# Patient Record
Sex: FEMALE | Race: WHITE | NOT HISPANIC OR LATINO | Employment: FULL TIME | URBAN - METROPOLITAN AREA
[De-identification: names, ages, dates, MRNs, and addresses within clinical notes are randomized per-mention and may not be internally consistent; named-entity substitution may affect disease eponyms.]

---

## 2019-12-05 ENCOUNTER — OFFICE VISIT (OUTPATIENT)
Dept: OBGYN CLINIC | Facility: CLINIC | Age: 24
End: 2019-12-05
Payer: COMMERCIAL

## 2019-12-05 VITALS
HEIGHT: 67 IN | WEIGHT: 172 LBS | SYSTOLIC BLOOD PRESSURE: 122 MMHG | DIASTOLIC BLOOD PRESSURE: 80 MMHG | BODY MASS INDEX: 27 KG/M2

## 2019-12-05 DIAGNOSIS — Z12.4 ROUTINE CERVICAL SMEAR: ICD-10-CM

## 2019-12-05 DIAGNOSIS — Z11.3 SCREENING FOR STDS (SEXUALLY TRANSMITTED DISEASES): ICD-10-CM

## 2019-12-05 DIAGNOSIS — Z01.419 ENCOUNTER FOR WELL WOMAN EXAM: Primary | ICD-10-CM

## 2019-12-05 DIAGNOSIS — Z11.8 SCREENING FOR CHLAMYDIAL DISEASE: ICD-10-CM

## 2019-12-05 DIAGNOSIS — Z30.42 SURVEILLANCE OF CONTRACEPTIVE INJECTION: ICD-10-CM

## 2019-12-05 PROCEDURE — S0610 ANNUAL GYNECOLOGICAL EXAMINA: HCPCS | Performed by: PHYSICIAN ASSISTANT

## 2019-12-05 RX ORDER — MEDROXYPROGESTERONE ACETATE 150 MG/ML
150 INJECTION, SUSPENSION INTRAMUSCULAR
Qty: 1 ML | Refills: 3 | Status: SHIPPED | OUTPATIENT
Start: 2019-12-05

## 2019-12-05 NOTE — PROGRESS NOTES
Assessment/Plan:    No problem-specific Assessment & Plan notes found for this encounter  Diagnoses and all orders for this visit:    Encounter for well woman exam    Routine cervical smear  -     GP PAP/CT/GC (Reflex HPV PLUS when ASC-US)    Screening for STDs (sexually transmitted diseases)  -     GP PAP/CT/GC (Reflex HPV PLUS when ASC-US)  -     RPR; Future  -     HIV 1/2 AG-AB combo; Future  -     Hepatitis B surface antigen; Future    Screening for chlamydial disease  -     GP PAP/CT/GC (Reflex HPV PLUS when ASC-US)    Surveillance of contraceptive injection  -     medroxyPROGESTERone (DEPO-PROVERA) 150 mg/mL injection; Inject 1 mL (150 mg total) into a muscle every 3 (three) months          Subjective:      Patient ID: Mable Reyes is a 21 y o  female  Pt presents for her annual exam today--  She has no complaints  She has regular bleeding, no pelvic pain  Bowel and bladder are regular  No breast concerns today  Wants BC  H/o BTB on low dose pills  +nausea, stomach pains on higher dose  Patch was ok  Ring made her nervouse  Would like Depo  Would like STD testing for peace of mind, no symptoms    pap today  Cultures done  Rx BW  Rx Depo  mvi        The following portions of the patient's history were reviewed and updated as appropriate: allergies, current medications, past family history, past medical history, past social history, past surgical history and problem list     Review of Systems   Constitutional: Negative for chills, fever and unexpected weight change  Gastrointestinal: Negative for abdominal pain, blood in stool, constipation and diarrhea  Genitourinary: Negative  Objective:      /80 (BP Location: Left arm, Patient Position: Sitting, Cuff Size: Standard)   Ht 5' 6 5" (1 689 m)   Wt 78 kg (172 lb)   LMP 11/21/2019 (Approximate)   Breastfeeding? No   BMI 27 35 kg/m²          Physical Exam   Constitutional: She appears well-developed and well-nourished     HENT: Head: Normocephalic and atraumatic  Neck: Normal range of motion  Pulmonary/Chest: Right breast exhibits no inverted nipple, no mass, no nipple discharge and no skin change  Left breast exhibits no inverted nipple, no mass, no nipple discharge and no skin change  Abdominal: Soft  Genitourinary: Vagina normal and uterus normal  Pelvic exam was performed with patient supine  There is no rash, tenderness or lesion on the right labia  There is no rash, tenderness or lesion on the left labia  Cervix exhibits no motion tenderness, no discharge and no friability  Right adnexum displays no mass, no tenderness and no fullness  Left adnexum displays no mass, no tenderness and no fullness  Lymphadenopathy: No inguinal adenopathy noted on the right or left side  Nursing note and vitals reviewed

## 2019-12-05 NOTE — PROGRESS NOTES
Pt is here for yearly exam   Pt is having regular cycles, and is interested in starting birth control  She was taking a pill about 3 months ago, but stopped because she was forgetting to take it  She has no breast concerns and B&B ok     pap normal, Gc/chlamydia neg 2/23/16

## 2019-12-11 DIAGNOSIS — N76.0 BACTERIAL VAGINOSIS: Primary | ICD-10-CM

## 2019-12-11 DIAGNOSIS — B96.89 BACTERIAL VAGINOSIS: Primary | ICD-10-CM

## 2019-12-11 LAB
DEPRECATED C TRACH RRNA XXX QL PRB: NOT DETECTED
N GONORRHOEA DNA UR QL NAA+PROBE: NOT DETECTED
THIN PREP CVX: NORMAL

## 2019-12-11 RX ORDER — METRONIDAZOLE 7.5 MG/G
1 GEL VAGINAL
Qty: 70 G | Refills: 0 | Status: SHIPPED | OUTPATIENT
Start: 2019-12-11

## 2019-12-16 ENCOUNTER — OFFICE VISIT (OUTPATIENT)
Dept: OBGYN CLINIC | Facility: CLINIC | Age: 24
End: 2019-12-16
Payer: COMMERCIAL

## 2019-12-16 VITALS — DIASTOLIC BLOOD PRESSURE: 80 MMHG | SYSTOLIC BLOOD PRESSURE: 120 MMHG

## 2019-12-16 DIAGNOSIS — Z30.013 ENCOUNTER FOR INITIAL PRESCRIPTION OF INJECTABLE CONTRACEPTIVE: Primary | ICD-10-CM

## 2019-12-16 NOTE — PROGRESS NOTES
The patient is here for a depo injection in the LEFT Deltoid  The patient has no complaints  The patient tolerated the injection well       LOT: [id number]  EXP: 1/2021  NDC: 0090-2968-42

## 2019-12-17 PROCEDURE — 96372 THER/PROPH/DIAG INJ SC/IM: CPT | Performed by: OBSTETRICS & GYNECOLOGY

## 2019-12-17 RX ORDER — MEDROXYPROGESTERONE ACETATE 150 MG/ML
150 INJECTION, SUSPENSION INTRAMUSCULAR ONCE
Status: COMPLETED | OUTPATIENT
Start: 2019-12-17 | End: 2019-12-17

## 2019-12-17 RX ADMIN — MEDROXYPROGESTERONE ACETATE 150 MG: 150 INJECTION, SUSPENSION INTRAMUSCULAR at 16:06

## 2019-12-19 ENCOUNTER — TELEPHONE (OUTPATIENT)
Dept: OBGYN CLINIC | Facility: CLINIC | Age: 24
End: 2019-12-19

## 2019-12-19 NOTE — TELEPHONE ENCOUNTER
Should not have side effects already  Take extra vitamin B complex and D daily and observe  NSAIDS prn

## 2020-06-07 ENCOUNTER — HOSPITAL ENCOUNTER (EMERGENCY)
Facility: HOSPITAL | Age: 25
Discharge: HOME | End: 2020-06-07
Attending: EMERGENCY MEDICINE | Admitting: EMERGENCY MEDICINE
Payer: COMMERCIAL

## 2020-06-07 ENCOUNTER — APPOINTMENT (EMERGENCY)
Dept: RADIOLOGY | Facility: HOSPITAL | Age: 25
End: 2020-06-07
Payer: COMMERCIAL

## 2020-06-07 VITALS
RESPIRATION RATE: 18 BRPM | BODY MASS INDEX: 28.46 KG/M2 | OXYGEN SATURATION: 97 % | WEIGHT: 179.01 LBS | DIASTOLIC BLOOD PRESSURE: 67 MMHG | SYSTOLIC BLOOD PRESSURE: 112 MMHG | HEART RATE: 86 BPM | TEMPERATURE: 98.2 F

## 2020-06-07 DIAGNOSIS — S60.221A CONTUSION OF RIGHT HAND, INITIAL ENCOUNTER: Primary | ICD-10-CM

## 2020-06-07 PROCEDURE — 99283 EMERGENCY DEPT VISIT LOW MDM: CPT

## 2020-06-07 PROCEDURE — 99284 EMERGENCY DEPT VISIT MOD MDM: CPT | Performed by: EMERGENCY MEDICINE

## 2020-06-07 PROCEDURE — 73130 X-RAY EXAM OF HAND: CPT

## 2020-06-07 RX ORDER — IBUPROFEN 400 MG/1
400 TABLET ORAL ONCE
Status: COMPLETED | OUTPATIENT
Start: 2020-06-07 | End: 2020-06-07

## 2020-06-07 RX ORDER — ACETAMINOPHEN 325 MG/1
650 TABLET ORAL ONCE
Status: COMPLETED | OUTPATIENT
Start: 2020-06-07 | End: 2020-06-07

## 2020-06-07 RX ADMIN — ACETAMINOPHEN 650 MG: 325 TABLET, FILM COATED ORAL at 09:54

## 2020-06-07 RX ADMIN — IBUPROFEN 400 MG: 400 TABLET ORAL at 09:54

## 2020-06-12 ENCOUNTER — HOSPITAL ENCOUNTER (OUTPATIENT)
Dept: RADIOLOGY | Facility: HOSPITAL | Age: 25
Discharge: HOME | End: 2020-06-12
Payer: COMMERCIAL

## 2020-06-12 ENCOUNTER — OFFICE VISIT (OUTPATIENT)
Dept: OBGYN CLINIC | Facility: HOSPITAL | Age: 25
End: 2020-06-12
Payer: COMMERCIAL

## 2020-06-12 VITALS
HEIGHT: 67 IN | HEART RATE: 80 BPM | WEIGHT: 173 LBS | SYSTOLIC BLOOD PRESSURE: 108 MMHG | BODY MASS INDEX: 27.15 KG/M2 | DIASTOLIC BLOOD PRESSURE: 69 MMHG

## 2020-06-12 DIAGNOSIS — M65.4 TENOSYNOVITIS, DE QUERVAIN: Primary | ICD-10-CM

## 2020-06-12 DIAGNOSIS — M25.531 PAIN IN RIGHT WRIST: ICD-10-CM

## 2020-06-12 DIAGNOSIS — S60.221A CONTUSION OF RIGHT HAND, INITIAL ENCOUNTER: ICD-10-CM

## 2020-06-12 DIAGNOSIS — G56.11 NEURITIS OF RIGHT MEDIAN NERVE: ICD-10-CM

## 2020-06-12 PROCEDURE — 99203 OFFICE O/P NEW LOW 30 MIN: CPT | Performed by: SURGERY

## 2020-06-12 PROCEDURE — 73110 X-RAY EXAM OF WRIST: CPT

## 2020-06-12 RX ORDER — METHYLPREDNISOLONE 4 MG/1
TABLET ORAL
Qty: 1 EACH | Refills: 0 | Status: SHIPPED | OUTPATIENT
Start: 2020-06-12

## 2022-02-04 ENCOUNTER — OFFICE VISIT (OUTPATIENT)
Dept: OBGYN CLINIC | Facility: CLINIC | Age: 27
End: 2022-02-04

## 2022-02-04 VITALS
BODY MASS INDEX: 30.86 KG/M2 | HEIGHT: 66 IN | DIASTOLIC BLOOD PRESSURE: 80 MMHG | WEIGHT: 192 LBS | SYSTOLIC BLOOD PRESSURE: 140 MMHG

## 2022-02-04 DIAGNOSIS — Z11.3 SCREENING FOR STDS (SEXUALLY TRANSMITTED DISEASES): ICD-10-CM

## 2022-02-04 DIAGNOSIS — N94.10 DYSPAREUNIA, FEMALE: Primary | ICD-10-CM

## 2022-02-04 DIAGNOSIS — B37.49 GENITAL CANDIDIASIS: ICD-10-CM

## 2022-02-04 DIAGNOSIS — Z11.8 SCREENING FOR CHLAMYDIAL DISEASE: ICD-10-CM

## 2022-02-04 DIAGNOSIS — N89.8 VAGINAL IRRITATION: ICD-10-CM

## 2022-02-04 DIAGNOSIS — N76.0 ACUTE VAGINITIS: ICD-10-CM

## 2022-02-04 DIAGNOSIS — N72 CERVICITIS: ICD-10-CM

## 2022-02-04 PROCEDURE — 99213 OFFICE O/P EST LOW 20 MIN: CPT | Performed by: PHYSICIAN ASSISTANT

## 2022-02-04 NOTE — PROGRESS NOTES
The patient is here because she is having painful intercourse  The patient feels a lot of vaginal dryness  The patient has a dull pain during intercourse  No spotting or bleeding  The patient has had the pain for two weeks  No vaginal discharge, itching or irritation  The patient has not been having any vaginal discharge  The patient has irregular periods  The patient has been having heavier periods and has been more emotional during her periods  She also had bad headaches and severe breast tenderness for the last two periods  She usually has period at the beginning of each month, but in November her period was late  She had period around thanksgiving  The patient has not been using condoms  20

## 2022-02-04 NOTE — PROGRESS NOTES
Assessment/Plan:    No problem-specific Assessment & Plan notes found for this encounter  Diagnoses and all orders for this visit:    Dyspareunia, female          Subjective:      Patient ID: Brian Greenwood is a 31 y o  female  Pt presents for a problem today  She complains of painful intercourse x 2 weeks  Feels dry  Feels irritated  No discharge  No bleeding  No change in partner or products  Bowel and bladder ok  Periods regular  Slightly heavy    Cultures done  Witch hazel  probiotic      The following portions of the patient's history were reviewed and updated as appropriate: allergies, current medications, past family history, past medical history, past social history, past surgical history and problem list     Review of Systems   Constitutional: Negative for chills, fever and unexpected weight change  Gastrointestinal: Negative for abdominal pain, blood in stool, constipation and diarrhea  Genitourinary: Negative  Objective:      /80   Ht 5' 6" (1 676 m)   Wt 87 1 kg (192 lb)   LMP 01/02/2022 (Approximate)   BMI 30 99 kg/m²          Physical Exam  Vitals and nursing note reviewed  Constitutional:       Appearance: She is well-developed  Genitourinary:     Exam position: Supine  Labia:         Right: No rash or lesion  Left: No rash or lesion  Vagina: Normal       Cervix: No cervical motion tenderness, discharge or friability  Lymphadenopathy:      Lower Body: No right inguinal adenopathy  No left inguinal adenopathy

## 2022-02-11 ENCOUNTER — TELEPHONE (OUTPATIENT)
Dept: OBGYN CLINIC | Facility: CLINIC | Age: 27
End: 2022-02-11

## 2022-02-11 NOTE — TELEPHONE ENCOUNTER
I called [facility] and they said the results should be in on Monday  I called the patient back and let her know

## 2022-02-11 NOTE — TELEPHONE ENCOUNTER
Pt is calling for her culture results from 2/4/22  Can you check with [facility] to see ir her culture is done yet?

## 2022-02-16 ENCOUNTER — TELEPHONE (OUTPATIENT)
Dept: OBGYN CLINIC | Facility: CLINIC | Age: 27
End: 2022-02-16

## 2022-02-16 LAB
A VAGINAE DNA VAG NAA+PROBE-LOG#: <3.25
A VAGINAE DNA VAG QL NAA+PROBE: NOT DETECTED
BVAB2 DNA VAG QL NAA+PROBE: NOT DETECTED
G VAGINALIS DNA SPEC QL NAA+PROBE: DETECTED
G VAGINALIS DNA VAG NAA+PROBE-LOG#: ABNORMAL
LACTOBACILLUS DNA VAG NAA+PROBE-LOG#: ABNORMAL
LACTOBACILLUS DNA VAG NAA+PROBE-LOG#: DETECTED
MEGA1 DNA VAG QL NAA+PROBE: NOT DETECTED
SL AMB C.ALBICANS BY PCR: DETECTED
SL AMB CANDIDA GENUS: DETECTED
T VAGINALIS RRNA SPEC QL NAA+PROBE: NOT DETECTED

## 2022-02-16 NOTE — TELEPHONE ENCOUNTER
Pt's results from 2/4/22 still aren't back I was that you did call on 2/11/22 and they said results would be done by that day but they are not back yet and today is 2/15/22  Can you please check on this again because pt concerned

## 2022-02-17 DIAGNOSIS — B96.89 BV (BACTERIAL VAGINOSIS): ICD-10-CM

## 2022-02-17 DIAGNOSIS — B37.9 YEAST INFECTION: Primary | ICD-10-CM

## 2022-02-17 DIAGNOSIS — N76.0 BV (BACTERIAL VAGINOSIS): ICD-10-CM

## 2022-02-17 RX ORDER — METRONIDAZOLE 500 MG/1
500 TABLET ORAL EVERY 12 HOURS SCHEDULED
Qty: 14 TABLET | Refills: 0 | Status: SHIPPED | OUTPATIENT
Start: 2022-02-17 | End: 2022-02-24

## 2022-03-30 ENCOUNTER — TELEPHONE (OUTPATIENT)
Dept: BARIATRICS | Facility: CLINIC | Age: 27
End: 2022-03-30

## 2022-06-30 ENCOUNTER — OFFICE VISIT (OUTPATIENT)
Dept: OBGYN CLINIC | Facility: CLINIC | Age: 27
End: 2022-06-30
Payer: COMMERCIAL

## 2022-06-30 VITALS
WEIGHT: 170 LBS | SYSTOLIC BLOOD PRESSURE: 120 MMHG | DIASTOLIC BLOOD PRESSURE: 80 MMHG | BODY MASS INDEX: 27.32 KG/M2 | HEIGHT: 66 IN

## 2022-06-30 DIAGNOSIS — Z11.3 SCREENING FOR STDS (SEXUALLY TRANSMITTED DISEASES): ICD-10-CM

## 2022-06-30 DIAGNOSIS — N89.8 VAGINAL ITCHING: Primary | ICD-10-CM

## 2022-06-30 DIAGNOSIS — Z11.8 SCREENING FOR CHLAMYDIAL DISEASE: ICD-10-CM

## 2022-06-30 DIAGNOSIS — B37.49 GENITAL CANDIDIASIS: ICD-10-CM

## 2022-06-30 DIAGNOSIS — N72 CERVICITIS: ICD-10-CM

## 2022-06-30 DIAGNOSIS — N76.0 ACUTE VAGINITIS: ICD-10-CM

## 2022-06-30 DIAGNOSIS — N89.8 VAGINAL DISCHARGE: ICD-10-CM

## 2022-06-30 PROCEDURE — 99213 OFFICE O/P EST LOW 20 MIN: CPT | Performed by: PHYSICIAN ASSISTANT

## 2022-06-30 NOTE — PROGRESS NOTES
Assessment/Plan:    No problem-specific Assessment & Plan notes found for this encounter  Diagnoses and all orders for this visit:    Vaginal itching          Subjective:      Patient ID: Lisandro Reyes is a 31 y o  female  Pt presents for a problem today  She complains of vaginal itching x few days  Some discharge  Denies any change in meds, products, partner    Bowel and bladder ok  Periods regular    Cultures done  Witch hazel and cortisone prn  Daily probiotic      The following portions of the patient's history were reviewed and updated as appropriate: allergies, current medications, past family history, past medical history, past social history, past surgical history and problem list     Review of Systems   Constitutional: Negative for chills, fever and unexpected weight change  Gastrointestinal: Negative for abdominal pain, blood in stool, constipation and diarrhea  Genitourinary: Positive for vaginal discharge and vaginal pain  Negative for vaginal bleeding  Objective:      /80   Ht 5' 6" (1 676 m)   Wt 77 1 kg (170 lb)   LMP 06/12/2022 (Exact Date)   BMI 27 44 kg/m²          Physical Exam  Vitals and nursing note reviewed  Constitutional:       Appearance: She is well-developed  Genitourinary:     Exam position: Supine  Labia:         Right: No rash or lesion  Left: No rash or lesion  Vagina: Vaginal discharge present  Cervix: No cervical motion tenderness, discharge or friability  Lymphadenopathy:      Lower Body: No right inguinal adenopathy  No left inguinal adenopathy

## 2022-07-03 LAB
A VAGINAE DNA VAG NAA+PROBE-LOG#: <3.25
A VAGINAE DNA VAG QL NAA+PROBE: NOT DETECTED
BVAB2 DNA VAG QL NAA+PROBE: NOT DETECTED
C TRACH DNA SPEC QL PROBE+SIG AMP: NOT DETECTED
G VAGINALIS DNA SPEC QL NAA+PROBE: NOT DETECTED
G VAGINALIS DNA VAG NAA+PROBE-LOG#: <3.25
HSV1 DNA SPEC QL NAA+PROBE: NOT DETECTED
HSV2 DNA SPEC QL NAA+PROBE: NOT DETECTED
LACTOBACILLUS DNA VAG NAA+PROBE-LOG#: DETECTED
LACTOBACILLUS DNA VAG NAA+PROBE-LOG#: NORMAL
M GENITALIUM DNA SPEC QL NAA+PROBE: NOT DETECTED
MEGA1 DNA VAG QL NAA+PROBE: NOT DETECTED
N GONORRHOEA DNA SPEC QL NAA+PROBE: NOT DETECTED
SL AMB C.ALBICANS BY PCR: NOT DETECTED
SL AMB CANDIDA GENUS: NOT DETECTED
T VAGINALIS RRNA SPEC QL NAA+PROBE: NOT DETECTED
T VAGINALIS RRNA SPEC QL NAA+PROBE: NOT DETECTED

## 2023-08-15 ENCOUNTER — OFFICE VISIT (OUTPATIENT)
Dept: GYNECOLOGY | Facility: CLINIC | Age: 28
End: 2023-08-15
Payer: COMMERCIAL

## 2023-08-15 VITALS
BODY MASS INDEX: 29.89 KG/M2 | WEIGHT: 186 LBS | SYSTOLIC BLOOD PRESSURE: 122 MMHG | DIASTOLIC BLOOD PRESSURE: 80 MMHG | HEIGHT: 66 IN

## 2023-08-15 DIAGNOSIS — N92.6 IRREGULAR MENSES: ICD-10-CM

## 2023-08-15 DIAGNOSIS — R10.2 PELVIC PAIN: Primary | ICD-10-CM

## 2023-08-15 PROCEDURE — 99213 OFFICE O/P EST LOW 20 MIN: CPT | Performed by: PHYSICIAN ASSISTANT

## 2023-08-16 NOTE — PROGRESS NOTES
Assessment/Plan:    No problem-specific Assessment & Plan notes found for this encounter. Diagnoses and all orders for this visit:    Pelvic pain  -     US pelvis complete w transvaginal; Future  -     Norethin-Eth Estrad-Fe Biphas 1 MG-10 MCG / 10 MCG TABS; Take 1 tablet by mouth daily    Irregular menses  -     US pelvis complete w transvaginal; Future  -     Norethin-Eth Estrad-Fe Biphas 1 MG-10 MCG / 10 MCG TABS; Take 1 tablet by mouth daily          Subjective:      Patient ID: Miguelina Salcido is a 28 y.o. female. Pt presents for a problem today  She had an episode of intense bleeding and cramping last week  Periods are generally regular  Pt does have pain with IC  occ has pelvic pain with cycles  She does feel extra fatgiued and +PMS  Normal US 1 year ago  Normal cultures    rx given to check us now  rx and samples lolo  nsaids prn      The following portions of the patient's history were reviewed and updated as appropriate: allergies, current medications, past family history, past medical history, past social history, past surgical history and problem list.    Review of Systems   Constitutional: Negative for chills, fever and unexpected weight change. Gastrointestinal: Negative for abdominal pain, blood in stool, constipation and diarrhea. Genitourinary: Positive for menstrual problem and pelvic pain. Objective:      /80   Ht 5' 6" (1.676 m)   Wt 84.4 kg (186 lb)   LMP 08/12/2023 (Exact Date)   BMI 30.02 kg/m²          Physical Exam  Vitals and nursing note reviewed. Constitutional:       Appearance: She is well-developed. Genitourinary:     Exam position: Supine. Labia:         Right: No rash or lesion. Left: No rash or lesion. Vagina: Normal.      Cervix: No cervical motion tenderness, discharge or friability. Lymphadenopathy:      Lower Body: No right inguinal adenopathy. No left inguinal adenopathy.

## 2023-09-25 ENCOUNTER — TELEPHONE (OUTPATIENT)
Dept: GYNECOLOGY | Facility: CLINIC | Age: 28
End: 2023-09-25

## 2023-09-25 NOTE — TELEPHONE ENCOUNTER
The patient is calling because she had her period twice this month. She had a period on 9/8/23 and also started bleeding six days ago. The patient is on lolo estrin and she started it two months ago. She is currently having heavy bleeding, clotting and has bad pelvic cramping. The patient wanted to know if she should be concerned and what she should do.

## 2024-01-27 DIAGNOSIS — R10.2 PELVIC PAIN: ICD-10-CM

## 2024-01-27 DIAGNOSIS — N92.6 IRREGULAR MENSES: ICD-10-CM

## 2024-02-01 RX ORDER — NORETHINDRONE ACETATE AND ETHINYL ESTRADIOL, ETHINYL ESTRADIOL AND FERROUS FUMARATE 1MG-10(24)
1 KIT ORAL DAILY
Qty: 84 TABLET | Refills: 0 | Status: SHIPPED | OUTPATIENT
Start: 2024-02-01